# Patient Record
Sex: FEMALE | Race: WHITE | NOT HISPANIC OR LATINO | URBAN - METROPOLITAN AREA
[De-identification: names, ages, dates, MRNs, and addresses within clinical notes are randomized per-mention and may not be internally consistent; named-entity substitution may affect disease eponyms.]

---

## 2021-02-21 ENCOUNTER — EMERGENCY (EMERGENCY)
Facility: HOSPITAL | Age: 46
LOS: 1 days | End: 2021-02-21
Attending: EMERGENCY MEDICINE
Payer: SELF-PAY

## 2021-02-21 VITALS
DIASTOLIC BLOOD PRESSURE: 69 MMHG | TEMPERATURE: 99 F | SYSTOLIC BLOOD PRESSURE: 126 MMHG | OXYGEN SATURATION: 97 % | RESPIRATION RATE: 18 BRPM | HEART RATE: 78 BPM

## 2021-02-21 LAB
ALBUMIN SERPL ELPH-MCNC: 4.1 G/DL — SIGNIFICANT CHANGE UP (ref 3.3–5.2)
ALP SERPL-CCNC: 56 U/L — SIGNIFICANT CHANGE UP (ref 40–120)
ALT FLD-CCNC: 9 U/L — SIGNIFICANT CHANGE UP
ANION GAP SERPL CALC-SCNC: 11 MMOL/L — SIGNIFICANT CHANGE UP (ref 5–17)
ANISOCYTOSIS BLD QL: SLIGHT — SIGNIFICANT CHANGE UP
AST SERPL-CCNC: 13 U/L — SIGNIFICANT CHANGE UP
BASOPHILS # BLD AUTO: 0 K/UL — SIGNIFICANT CHANGE UP (ref 0–0.2)
BASOPHILS NFR BLD AUTO: 0 % — SIGNIFICANT CHANGE UP (ref 0–2)
BILIRUB SERPL-MCNC: <0.2 MG/DL — LOW (ref 0.4–2)
BUN SERPL-MCNC: 11 MG/DL — SIGNIFICANT CHANGE UP (ref 8–20)
BURR CELLS BLD QL SMEAR: PRESENT — SIGNIFICANT CHANGE UP
CALCIUM SERPL-MCNC: 8.7 MG/DL — SIGNIFICANT CHANGE UP (ref 8.6–10.2)
CHLORIDE SERPL-SCNC: 105 MMOL/L — SIGNIFICANT CHANGE UP (ref 98–107)
CO2 SERPL-SCNC: 21 MMOL/L — LOW (ref 22–29)
CREAT SERPL-MCNC: 0.67 MG/DL — SIGNIFICANT CHANGE UP (ref 0.5–1.3)
ELLIPTOCYTES BLD QL SMEAR: SIGNIFICANT CHANGE UP
EOSINOPHIL # BLD AUTO: 0.28 K/UL — SIGNIFICANT CHANGE UP (ref 0–0.5)
EOSINOPHIL NFR BLD AUTO: 4.4 % — SIGNIFICANT CHANGE UP (ref 0–6)
GIANT PLATELETS BLD QL SMEAR: PRESENT — SIGNIFICANT CHANGE UP
GLUCOSE SERPL-MCNC: 116 MG/DL — HIGH (ref 70–99)
HCT VFR BLD CALC: 35.2 % — SIGNIFICANT CHANGE UP (ref 34.5–45)
HGB BLD-MCNC: 10.7 G/DL — LOW (ref 11.5–15.5)
LYMPHOCYTES # BLD AUTO: 2.02 K/UL — SIGNIFICANT CHANGE UP (ref 1–3.3)
LYMPHOCYTES # BLD AUTO: 31.6 % — SIGNIFICANT CHANGE UP (ref 13–44)
MANUAL SMEAR VERIFICATION: SIGNIFICANT CHANGE UP
MCHC RBC-ENTMCNC: 21.8 PG — LOW (ref 27–34)
MCHC RBC-ENTMCNC: 30.4 GM/DL — LOW (ref 32–36)
MCV RBC AUTO: 71.7 FL — LOW (ref 80–100)
MICROCYTES BLD QL: SLIGHT — SIGNIFICANT CHANGE UP
MONOCYTES # BLD AUTO: 0.39 K/UL — SIGNIFICANT CHANGE UP (ref 0–0.9)
MONOCYTES NFR BLD AUTO: 6.1 % — SIGNIFICANT CHANGE UP (ref 2–14)
NEUTROPHILS # BLD AUTO: 3.65 K/UL — SIGNIFICANT CHANGE UP (ref 1.8–7.4)
NEUTROPHILS NFR BLD AUTO: 57 % — SIGNIFICANT CHANGE UP (ref 43–77)
OVALOCYTES BLD QL SMEAR: SLIGHT — SIGNIFICANT CHANGE UP
PLAT MORPH BLD: NORMAL — SIGNIFICANT CHANGE UP
PLATELET # BLD AUTO: 268 K/UL — SIGNIFICANT CHANGE UP (ref 150–400)
POIKILOCYTOSIS BLD QL AUTO: SIGNIFICANT CHANGE UP
POLYCHROMASIA BLD QL SMEAR: SLIGHT — SIGNIFICANT CHANGE UP
POTASSIUM SERPL-MCNC: 4 MMOL/L — SIGNIFICANT CHANGE UP (ref 3.5–5.3)
POTASSIUM SERPL-SCNC: 4 MMOL/L — SIGNIFICANT CHANGE UP (ref 3.5–5.3)
PROT SERPL-MCNC: 7.1 G/DL — SIGNIFICANT CHANGE UP (ref 6.6–8.7)
RBC # BLD: 4.91 M/UL — SIGNIFICANT CHANGE UP (ref 3.8–5.2)
RBC # FLD: 17.1 % — HIGH (ref 10.3–14.5)
RBC BLD AUTO: ABNORMAL
SMUDGE CELLS # BLD: PRESENT — SIGNIFICANT CHANGE UP
SODIUM SERPL-SCNC: 137 MMOL/L — SIGNIFICANT CHANGE UP (ref 135–145)
TROPONIN T SERPL-MCNC: <0.01 NG/ML — SIGNIFICANT CHANGE UP (ref 0–0.06)
VARIANT LYMPHS # BLD: 0.9 % — SIGNIFICANT CHANGE UP (ref 0–6)
WBC # BLD: 6.4 K/UL — SIGNIFICANT CHANGE UP (ref 3.8–10.5)
WBC # FLD AUTO: 6.4 K/UL — SIGNIFICANT CHANGE UP (ref 3.8–10.5)

## 2021-02-21 PROCEDURE — 36415 COLL VENOUS BLD VENIPUNCTURE: CPT

## 2021-02-21 PROCEDURE — 84484 ASSAY OF TROPONIN QUANT: CPT

## 2021-02-21 PROCEDURE — 71045 X-RAY EXAM CHEST 1 VIEW: CPT | Mod: 26

## 2021-02-21 PROCEDURE — 99053 MED SERV 10PM-8AM 24 HR FAC: CPT

## 2021-02-21 PROCEDURE — 85025 COMPLETE CBC W/AUTO DIFF WBC: CPT

## 2021-02-21 PROCEDURE — 71045 X-RAY EXAM CHEST 1 VIEW: CPT

## 2021-02-21 PROCEDURE — 93010 ELECTROCARDIOGRAM REPORT: CPT

## 2021-02-21 PROCEDURE — 93005 ELECTROCARDIOGRAM TRACING: CPT

## 2021-02-21 PROCEDURE — 99285 EMERGENCY DEPT VISIT HI MDM: CPT

## 2021-02-21 PROCEDURE — 80053 COMPREHEN METABOLIC PANEL: CPT

## 2021-02-21 PROCEDURE — 99284 EMERGENCY DEPT VISIT MOD MDM: CPT

## 2021-02-21 NOTE — ED ADULT TRIAGE NOTE - CHIEF COMPLAINT QUOTE
Pt c/o palpitations and chest discomfort that woke her from sleep 15 minutes PTA.  EKG completed in triage

## 2021-02-21 NOTE — ED PROVIDER NOTE - ATTENDING CONTRIBUTION TO CARE
Brief, self resolved episode of chest pressure and palpitations, ECG non-ischemic appearing with negative troponin. Pt asymptomatic now with no ACS risk factors, low suspicion this was unstable angina or AMI. Will dc with cardiology follow up, return precautions.

## 2021-02-21 NOTE — ED PROVIDER NOTE - NSFOLLOWUPINSTRUCTIONS_ED_ALL_ED_FT
Palpitaciones cardíacas    LO QUE NECESITA SABER:    Las palpitaciones cardíacas son sensaciones que se perciben colten aceleraciones, chema, latidos o aleteos del corazón. También podría sentir latidos adicionales, que hand corazón no late por un corto periodo de tiempo o que se saltean los latidos. Puede percibir estas sensaciones en el pecho, la garganta o el narayan. Pueden presentarse cuando está sentado, de pie o acostado. Las palpitaciones cardíacas pueden causar temor; sin embargo, generalmente no se deben a un problema importante.    INSTRUCCIONES SOBRE EL [facility] HOSPITAL[facility]IA:    Llame al 911 o pídale a alguien más que llame en cualquiera de los siguientes casos:  •Tiene alguno de los siguientes signos de un ataque cardíaco: ?Estrujamiento, presión o tensión en hand pecho      ?Usted también podría presentar alguno de los siguientes: ?Malestar o dolor en hand espalda, narayan, mandíbula, abdomen, o brazo      ?Falta de aliento      ?Náuseas o vómitos      ?Desvanecimiento o sudor frío repentino        •Usted tiene alguno de los siguientes signos de derrame cerebral: ?Adormecimiento o caída de un lado de hand leslie      ?Debilidad en un brazo o doris pierna      ?Confusión o debilidad para hablar      ?Mareos o dolor de karley intenso, o pérdida de la visión.      •Usted se desmaya o pierde el conocimiento.      Regrese a la doug de emergencias si:  •Naomi palpitaciones ocurren con más frecuencia o son más intensas.          Comuníquese con hand médico si:  •Usted tiene nueva inflamación en naomi pies o tobillos o esta ha empeorado.      •Usted tiene preguntas o inquietudes acerca de hand condición o cuidado.      Acuda a naomi consultas de control con hand médico según le indicaron.Es posible que necesite un seguimiento con un cardiólogo. Tae vez deba hacerse exámenes para determinar si sufre problemas cardíacos que le causan las palpitaciones. Anote naomi preguntas para que se acuerde de hacerlas roxane naomi visitas.    Mantenga un registro:Escriba cuándo naomi palpitaciones comienzan y terminan, qué estaba usted haciendo cuando comenzaron y naomi síntomas. Mantenga un registro de lo que usted comió o tomó roxane las horas antes de naomi palpitaciones. Incluya todo lo que aparentemente le ayudó a que naomi síntomas mejoraran colten acostarse o contener hand respiración. Leeanne registro le ayudará a usted y a hand médico para saber qué provoca naomi palpitaciones. Lleve el registro con usted a naomi citas de seguimiento.    Cómo ayudar a prevenir las palpitaciones cardíacas:  •Controlar el estrés y la ansiedad.Encuentre formas de relajarse, colten escuchar música, meditar o hacer yoga. El ejercicio también puede ayudar a disminuir el estrés y la ansiedad. Hablar con alguien de confianza acerca de hand estrés o ansiedad. También puede hablar con un psicoterapeuta.      •Duerma lo suficiente cada la noche.Pregunte a hand médico cuánto debería dormir usted cada noche.      •No tome bebidas con cafeína o alcohol.La cafeína y el alcohol pueden hacer que naomi palpitaciones empeoren. La cafeína se encuentra en refrescos, café, té, chocolate y bebidas que aumentan hand energía.      •No fume.La nicotina y otros químicos en los cigarrillos y cigarros podrían provocar daño a hand corazón y a naomi vasos sanguíneos. Pida información a hand médico si usted actualmente fuma y necesita ayuda para dejar de fumar. Los cigarrillos electrónicos o el tabaco sin humo igualmente contienen nicotina. Consulte con hand médico antes de utilizar estos productos.      •No use drogas ilegales.Hable con hand médico si consume drogas ilegales y quiere dejarlas.

## 2021-02-21 NOTE — ED PROVIDER NOTE - PATIENT PORTAL LINK FT
You can access the FollowMyHealth Patient Portal offered by [facility] by registering at the following website: [email or web address]. By joining [employer]’s FollowMyHealth portal, you will also be able to view your health information using other applications (apps) compatible with our system.

## 2021-02-21 NOTE — ED PROVIDER NOTE - NS ED ROS FT
Constitutional: No fever, lightheadedness  Eyes: No visual changes  ENMT: No neck pain or stiffness. No limited ROM  Cardiac: +palpitations, chest pain, SOB. No edema.   Respiratory: No cough or respiratory distress.   GI: No nausea, vomiting, diarrhea or abdominal pain.  MS: No myalgia, back pain.  Neuro: No headache or weakness.   Except as documented in the HPI, all other systems are negative.

## 2021-02-21 NOTE — ED ADULT NURSE NOTE - OBJECTIVE STATEMENT
received pt with  and MD at bedside, complaining of palpitations, vitals are stable on monitor, sating well on room air, no distress, iv in place , labs sent, safety maintained, continue to monitor

## 2021-02-21 NOTE — ED PROVIDER NOTE - OBJECTIVE STATEMENT
46 yo female with no significant hx presents to the ED complaining of palpitations. Patient states that she woke up in the middle of the night with palpitations causing her pain in the chest and SOB. The entire episode lasted about 3 minutes. Patient states she is now asymptomatic. Denies cardiac hx or fam hx of cardiac event. Denies N/V, diaphoresis, fever, abd pain, diarrhea.

## 2021-02-21 NOTE — ED PROVIDER NOTE - PHYSICAL EXAMINATION
CONSTITUTIONAL: Well-developed; well-nourished; in no acute distress. Sitting up and providing appropriate history and physical examination  SKIN: skin exam is warm and dry, no acute rash.  HEAD: Normocephalic; atraumatic.  EYES: EOM intact; conjunctiva and sclera clear.  NECK: Supple; non tender. + full passive ROM in all directions. No JVD  CARD: S1, S2 normal; no murmurs, gallops, or rubs. Regular rate and rhythm. + Symmetric Strong Pulses  RESP: No wheezes, rales or rhonchi. Good air movement bilaterally  ABD: soft; non-distended; non-tender.  EXT: Normal ROM. No  edema.  NEURO: Alert, oriented, grossly unremarkable.   PSYCH: Cooperative, appropriate.

## 2021-02-21 NOTE — ED PROVIDER NOTE - NSFOLLOWUPCLINICS_GEN_ALL_ED_FT
[facility]  Cardiology  [address], [place] [address]  Phone: [phone]  Fax:   Follow Up Time:

## 2021-02-21 NOTE — ED PROVIDER NOTE - CLINICAL SUMMARY MEDICAL DECISION MAKING FREE TEXT BOX
46 yo female presents with palpitations, chest pain, SOB. Patient is low risk for ACS. Will order labs to rule out. EKG is normal, no arrythmia.

## 2021-02-22 RX ORDER — AZITHROMYCIN 500 MG/1
1 TABLET, FILM COATED ORAL
Qty: 1 | Refills: 0
Start: 2021-02-22 | End: 2021-02-26

## 2021-02-22 NOTE — ED POST DISCHARGE NOTE - RESULT SUMMARY
cxr with right perihilar PNA . called and spoke with pt denies any cough, fever or Sob . states she feel better . xray explained to the pt. pt states she is from [place] come to visit the family . send a z pack . recommended to hold if she has any previous symptoms start taking or follow up with pcp asap . she understood and agreed